# Patient Record
Sex: MALE | Race: BLACK OR AFRICAN AMERICAN | NOT HISPANIC OR LATINO | URBAN - METROPOLITAN AREA
[De-identification: names, ages, dates, MRNs, and addresses within clinical notes are randomized per-mention and may not be internally consistent; named-entity substitution may affect disease eponyms.]

---

## 2017-01-11 ENCOUNTER — EMERGENCY (EMERGENCY)
Facility: HOSPITAL | Age: 22
LOS: 0 days | Discharge: HOME | End: 2017-01-11
Admitting: EMERGENCY MEDICINE
Payer: MEDICAID

## 2017-01-11 DIAGNOSIS — K61.2 ANORECTAL ABSCESS: ICD-10-CM

## 2017-01-11 PROCEDURE — 46050 I&D PERIANAL ABSCESS SUPFC: CPT

## 2017-01-11 PROCEDURE — 99284 EMERGENCY DEPT VISIT MOD MDM: CPT | Mod: 25

## 2017-05-16 ENCOUNTER — APPOINTMENT (OUTPATIENT)
Dept: COLORECTAL SURGERY | Facility: CLINIC | Age: 22
End: 2017-05-16

## 2017-05-16 VITALS
DIASTOLIC BLOOD PRESSURE: 89 MMHG | TEMPERATURE: 98.6 F | RESPIRATION RATE: 14 BRPM | HEART RATE: 86 BPM | SYSTOLIC BLOOD PRESSURE: 152 MMHG

## 2017-05-16 DIAGNOSIS — K61.1 RECTAL ABSCESS: ICD-10-CM

## 2017-05-16 DIAGNOSIS — Z78.9 OTHER SPECIFIED HEALTH STATUS: ICD-10-CM

## 2017-05-16 DIAGNOSIS — Z87.19 PERSONAL HISTORY OF OTHER DISEASES OF THE DIGESTIVE SYSTEM: ICD-10-CM

## 2017-05-17 ENCOUNTER — OTHER (OUTPATIENT)
Age: 22
End: 2017-05-17

## 2024-11-10 ENCOUNTER — EMERGENCY (EMERGENCY)
Facility: HOSPITAL | Age: 29
LOS: 0 days | Discharge: HOME | End: 2024-11-10
Attending: STUDENT IN AN ORGANIZED HEALTH CARE EDUCATION/TRAINING PROGRAM
Payer: SELF-PAY

## 2024-11-10 VITALS
SYSTOLIC BLOOD PRESSURE: 147 MMHG | HEART RATE: 87 BPM | DIASTOLIC BLOOD PRESSURE: 77 MMHG | OXYGEN SATURATION: 98 % | TEMPERATURE: 99 F | RESPIRATION RATE: 16 BRPM

## 2024-11-10 VITALS — WEIGHT: 192.02 LBS
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
## 2024-11-10 DIAGNOSIS — S99.911A UNSPECIFIED INJURY OF RIGHT ANKLE, INITIAL ENCOUNTER: ICD-10-CM
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
## 2024-11-10 DIAGNOSIS — S93.401A SPRAIN OF UNSPECIFIED LIGAMENT OF RIGHT ANKLE, INITIAL ENCOUNTER: ICD-10-CM

## 2024-11-10 PROCEDURE — 96372 THER/PROPH/DIAG INJ SC/IM: CPT

## 2024-11-10 PROCEDURE — 99283 EMERGENCY DEPT VISIT LOW MDM: CPT | Mod: 25

## 2024-11-10 PROCEDURE — 73610 X-RAY EXAM OF ANKLE: CPT | Mod: 26,RT

## 2024-11-10 PROCEDURE — 73610 X-RAY EXAM OF ANKLE: CPT | Mod: RT

## 2024-11-10 PROCEDURE — 99284 EMERGENCY DEPT VISIT MOD MDM: CPT

## 2024-11-10 RX ORDER — KETOROLAC TROMETHAMINE 30 MG/ML
30 INJECTION INTRAMUSCULAR; INTRAVENOUS ONCE
Refills: 0 | Status: DISCONTINUED | OUTPATIENT
Start: 2024-11-10 | End: 2024-11-10

## 2024-11-10 RX ADMIN — KETOROLAC TROMETHAMINE 30 MILLIGRAM(S): 30 INJECTION INTRAMUSCULAR; INTRAVENOUS at 19:00

## 2024-11-10 NOTE — ED STATDOCS - PATIENT PORTAL LINK FT
You can access the FollowMyHealth Patient Portal offered by [facility] by registering at the following website: [email or web address]. By joining [facility] FollowMyHealth portal, you will also be able to view your health information using other applications (apps) compatible with our system.

## 2024-11-10 NOTE — ED ADULT NURSE NOTE - OBJECTIVE STATEMENT
pt presents to the ER for R ankle pain since 9 am this morning. pt reports he was playing football, went to catch the ball and landed wrong on his ankle. states "at first I wasn't even able to get my cleat off of my foot it was so swollen". denies any other medical complaints.

## 2024-11-10 NOTE — ED STATDOCS - PHYSICAL EXAMINATION
Constitutional: NAD, alert, verbal  Cardiac: RRR no MRG  Resp: clear, no wheezing or crackles  RLE: swelling to right ankle on lateral and medial sides, ttp, pulses intact, sensation, motor intact

## 2024-11-10 NOTE — ED STATDOCS - NSFOLLOWUPINSTRUCTIONS_ED_ALL_ED_FT
FOLLOW UP WITH A DOCTOR THIS WEEK. CALL THE OFFICE TO MAKE AN APPOINTMENT. RETURN TO ER FOR ANY WORSENING SYMPTOMS OR NEW CONCERNS.     Ankle Sprain     An ankle sprain is a stretch or tear in a ligament in the ankle. Ligaments are tissues that connect bones to each other.  The two most common types of ankle sprains are:  Inversion sprain. This happens when the foot turns inward and the ankle rolls outward. It affects the ligament on the outside of the foot (lateral ligament).Eversion sprain. This happens when the foot turns outward and the ankle rolls inward. It affects the ligament on the inner side of the foot (medial ligament).What are the causes?  This condition is often caused by accidentally rolling or twisting the ankle.  What increases the risk?  You are more likely to develop this condition if you play sports.  What are the signs or symptoms?  Symptoms of this condition include:  Pain in your ankle.Swelling.Bruising. This may develop right after you sprain your ankle or 1–2 days later.Trouble standing or walking, especially when you turn or change directions.How is this diagnosed?  This condition is diagnosed with:  A physical exam. During the exam, your health care provider will press on certain parts of your foot and ankle and try to move them in certain ways.X-ray imaging. These may be taken to see how severe the sprain is and to check for broken bones.How is this treated?  This condition may be treated with:  A brace or splint. This is used to keep the ankle from moving until it heals.An elastic bandage. This is used to support the ankle.Crutches.Pain medicine.Surgery. This may be needed if the sprain is severe.Physical therapy. This may help to improve the range of motion in the ankle.Follow these instructions at home:  If you have a brace or a splint:     Wear the brace or splint as told by your health care provider. Remove it only as told by your health care provider.Loosen the brace or splint if your toes tingle, become numb, or turn cold and blue.Keep the brace or splint clean.If the brace or splint is not waterproof:  Do not let it get wet. Cover it with a watertight covering when you take a bath or a shower.If you have an elastic bandage (dressing):     Remove it to shower or bathe.Try not to move your ankle much, but wiggle your toes from time to time. This helps to prevent swelling.Adjust the dressing to make it more comfortable if it feels too tight.Loosen the dressing if you have numbness or tingling in your foot, or if your foot becomes cold and blue.Managing pain, stiffness, and swelling        Take over-the-counter and prescription medicines only as told by your health care provider.For 2–3 days, keep your ankle raised (elevated) above the level of your heart as much as possible.If directed, put ice on the injured area:  If you have a removable brace or splint, remove it as told by your health care provider.Put ice in a plastic bag.Place a towel between your skin and the bag.Leave the ice on for 20 minutes, 2–3 times a day.General instructions     Rest your ankle.Do not use the injured limb to support your body weight until your health care provider says that you can. Use crutches as told by your health care provider.Do not use any products that contain nicotine or tobacco, such as cigarettes, e-cigarettes, and chewing tobacco. If you need help quitting, ask your health care provider.Keep all follow-up visits as told by your health care provider. This is important.Contact a health care provider if:  You have rapidly increasing bruising or swelling.Your pain is not relieved with medicine.Get help right away if:  Your foot or toes become numb or blue.You have severe pain that gets worse.Summary  An ankle sprain is a stretch or tear in a ligament in the ankle. Ligaments are tissues that connect bones to each other.This condition is often caused by accidentally rolling or twisting the ankle.Symptoms include pain, swelling, bruising, and trouble walking.To relieve pain and swelling, put ice on the affected ankle, raise your ankle above the level of your heart, and use an elastic bandage.Keep all follow-up visits as told by your health care provider. This is important.This information is not intended to replace advice given to you by your health care provider. Make sure you discuss any questions you have with your health care provider.

## 2024-11-10 NOTE — ED STATDOCS - CLINICAL SUMMARY MEDICAL DECISION MAKING FREE TEXT BOX
29-year-old male presenting with a right ankle injury status post playing football earlier this morning.  Patient states he inverted his ankle and experienced lateral mall pain.  Took 4 Tylenol on the field.  Unable to bear weight secondary to pain review of systems otherwise negative    Right ankle appears swollen but neurovascularly intact    Rule out fracture will obtain x-ray pain control and reassess

## 2024-11-10 NOTE — ED PROCEDURE NOTE - NS ED PERI VASCULAR NEG

## 2024-11-10 NOTE — ED STATDOCS - PROGRESS NOTE DETAILS
signed Raquel Gee PA-C Pt initially seen and examined by Dr. Valera in intake.   29M c/o right medial ankle pain and swelling after he twisted it playing football this morning at 9am. Pt was unable to finish playing, initally able to "hobble" on it but now unable to wt bear. Moderate swelling medial mall. no TTP prox fib or base 5th MT. No significant findings on xray.  Treat as sprain, ace, aircast. crutches, WBAT f/u ortho v podiatry. rx motrin. Pt feeling well, pt and family agree with DC and plan of care.

## 2024-11-10 NOTE — ED ADULT NURSE NOTE - NSHOSCREENINGQ1_ED_ALL_ED
No return if suicidal.  Follow up with act team otherwise. ·  Safety plan details	return if suicidal.  Follow up with act team otherwise.

## 2024-11-10 NOTE — ED STATDOCS - CARE PROVIDER_API CALL
Marquez Zhong  Orthopaedic Surgery  [address] [place] [address]  Phone: [phone]  Fax: [phone]  Follow Up Time:     Gabbie Najera  Podiatric Medicine and Surgery  [address], [address]  [place] [address]  Phone: [phone]  Fax: [phone]  Follow Up Time:

## 2024-11-10 NOTE — ED ADULT TRIAGE NOTE - CHIEF COMPLAINT QUOTE
Pt presents to the ED c/o right ankle pain. Pt reports twisting his ankle today while playing football, right ankle swelling noted.
